# Patient Record
Sex: FEMALE | Race: WHITE
[De-identification: names, ages, dates, MRNs, and addresses within clinical notes are randomized per-mention and may not be internally consistent; named-entity substitution may affect disease eponyms.]

---

## 2018-04-11 ENCOUNTER — HOSPITAL ENCOUNTER (OUTPATIENT)
Dept: HOSPITAL 46 - DS | Age: 68
Discharge: HOME | End: 2018-04-11
Attending: OBSTETRICS & GYNECOLOGY
Payer: MEDICARE

## 2018-04-11 VITALS — BODY MASS INDEX: 29.02 KG/M2 | WEIGHT: 170 LBS | HEIGHT: 64 IN

## 2018-04-11 DIAGNOSIS — F17.200: ICD-10-CM

## 2018-04-11 DIAGNOSIS — Z79.899: ICD-10-CM

## 2018-04-11 DIAGNOSIS — F17.210: ICD-10-CM

## 2018-04-11 DIAGNOSIS — E66.9: ICD-10-CM

## 2018-04-11 DIAGNOSIS — N72: ICD-10-CM

## 2018-04-11 DIAGNOSIS — I10: ICD-10-CM

## 2018-04-11 DIAGNOSIS — Z98.890: ICD-10-CM

## 2018-04-11 DIAGNOSIS — Z79.82: ICD-10-CM

## 2018-04-11 DIAGNOSIS — N88.2: ICD-10-CM

## 2018-04-11 DIAGNOSIS — E78.00: ICD-10-CM

## 2018-04-11 DIAGNOSIS — N87.0: Primary | ICD-10-CM

## 2018-04-11 DIAGNOSIS — H40.9: ICD-10-CM

## 2018-04-11 DIAGNOSIS — D49.59: ICD-10-CM

## 2018-04-11 PROCEDURE — 0UT9FZZ RESECTION OF UTERUS, VIA NATURAL OR ARTIFICIAL OPENING WITH PERCUTANEOUS ENDOSCOPIC ASSISTANCE: ICD-10-PCS | Performed by: OBSTETRICS & GYNECOLOGY

## 2018-04-11 PROCEDURE — 0UT7FZZ RESECTION OF BILATERAL FALLOPIAN TUBES, VIA NATURAL OR ARTIFICIAL OPENING WITH PERCUTANEOUS ENDOSCOPIC ASSISTANCE: ICD-10-PCS | Performed by: OBSTETRICS & GYNECOLOGY

## 2018-04-11 NOTE — NUR
PT CONTINUES TO WRITHE IN PAIN. PT CONTINUES TO ASK FOR PAIN MEDICINE. CALL TO
[facility] TO ASSESS PATIENT'S BASELINE ANXIETY. CRNA REPORTS PATIENT SEEMED TO BE
ANXIOUS PREOP. CRNA DOES REPORT BRADYCARDIA UPON ARRIVAL TO OR AS WELL. WILL
CRACKERS GIVEN AND PT TOLERATES THOSE WELL. PRN GIVEN FOR PAIN.

## 2018-04-11 NOTE — NUR
PT UP TO BR W/RN ASSIST. PT VOIDS 25 ML BRIGHT YELLOW, URINE AND REPORTS "MY
STOMACH HURTS REALLY BAD". PT AMBULATES BACK TO BED AND POSITIONS HERSELF ON
HER RIGHT SIDE. CONTINUOUS PULSE OXIMETER CONNECTED AND OXYGEN SATURATION IS
98% ON RA. OXYGEN IS DC @ THIS TIME. URI HUGGER ON WARM. MORE ICED WATER
GIVEN. PT RESTS QUIETLY WHEN LEFT UNSTIMULATED.

## 2018-04-11 NOTE — NUR
10ML NS DEFLATED OUT OF BALLOON AND MCDANIEL CATHETER IS REMOVED. 200 ML BRIGHT
YELLOW URINE NOTED TO OVERNIGHT MCDANIEL CATHETER BAG. PATIENT ATTEMPTS TO GET
OUT OF BED PREMATURELY ON SEVERAL OCCASIONS. PATIENT FOLLOWS INSTRUCTIONS FROM
RN. PT AMBULATES TO THE BATHROOM QUICKLY WITH ASSISTANCE FROM RN. PT REQUESTS
TO BE LEFT ALONE IN THE BATHROOM AND IS INSTRUCTED ON HOW TO USE THE CALL
LIGHT. SHE VERBALIZES UNDERSTANDING.

## 2018-04-11 NOTE — NUR
PT UP TO BR W/RN STANDBY. PT AMBULATES WELL AND VOIDS 100 ML BRIGHT YELLOW,
URINE. PT REQ DC HOME. DAUGHTER PHONED AND WILL COME TO PROVIDE PT HER RIDE
HOME. PT DRESSING SELF.

## 2018-04-11 NOTE — NUR
PT WRITHING IN PAIN IN BED. RN @ BS CONTINUOUSLY D/T ALARM OF CONTINUOUS PULSE
OXIMETER NOT GETTING A GOOD READING. PT REPORTS "IT'S HOT IN HERE". RUI
HUGGER TURNED OFF. PRN GIVEN. PULSE IS 50 AND OXYGEN SATURATION IS 97% PT
APPEARS TO BE COMFORTABLE. SHE IS RESTING QUIETLY. RR EVEN AND UNLABORED.

## 2018-04-11 NOTE — NUR
PT DESATURATES TO 86% ON ROOM AIR WHILE SLEEPING. OXYGEN INCREASED TO 1L VIA
NC AND IMMEDIATELY INCREASES TO 91%

## 2018-04-11 NOTE — NUR
PT RESTING QUIETLY W/RR EVEN AND UNLABORED. PT WAKES EASILY TO RN VOICE AND
REPORTS FEELING "MUCH BETTER". PATIENT RATES PAIN "ZERO". OXYGEN SATURATION
98% ON 1L VIA NC. OXYGEN TURNED OFF @ THIS TIME. PT DENIES ADD'L NEEDS @ THIS
TIME.

## 2018-04-11 NOTE — NUR
04/11/18 1240 Kay Townsend 1231 PT ARRIVED IN PACU WITH ORAL AIRWAY IN PLACE. 1233 ORAL AIRWAY
REMOVED. PT SLEEPY WITH NO C/O'S. MCDANIEL CATHETER PRESENT WITH BRIGHT
YELLOW URINE.

## 2018-04-11 NOTE — NUR
PT WAS ALERT AND ORIENTED. SHE SEEMED PREPARED, WAITING FOR DR TO COME IN.
VERY PLEASANT, HAD NO QUESTIONS, PT DECLINED PRAYER AT THIS TIME. WILL FOLLOW
AS NEEDED

## 2018-04-11 NOTE — NUR
PT RETURNS TO DEPT QUITE SOMNOLENT. PT ABLE TO RATE PAIN; HOWEVER, WORDS ARE
SLURRED AT THIS TIME. WHEN STIMULATED, PT REPORTS "I NEED TO GET UP". PT IS
REASSURED W/RN VOICE. ICED WATER IS @ BS. BED RAILS ARE UP X 2. CALL LIGHT
W/IN REACH. ATTEMPT MADE TO LOCATE PATIENT'S FAMILY.

## 2018-04-11 NOTE — NUR
PHONE CALL TO DAUGHTER TO REPORT HER MOM IS BACK IN DAY SURGERY. DAUGHTER ASKS
IF MOTHER IS READY TO GO HOME. PLAN OF CARE RELAYED AND SHE VERBALIZES
UNDERSTANDING.

## 2018-04-11 NOTE — NUR
PT UNABLE TO VOID. SMALL DROPLETS OF BLOOD NOTED TO URINE HAT. FRANSISCO PAD AND
MESH PANTIES PLACED. PT MOANING AND REPEATING "MY STOMACH HURTS REALLY BAD".
PT AMBULATES BACK TO BED W/ASSIST. SCDS IN PLACE. RUI HUGGER ON WARM. PRN
PULLED FOR PAIN. WHEN UNSTIMULATED, PT FALLS QUICKLY TO SLEEP. WILL CONTINUE
TO MONITOR REGARDING PAIN MEDICINE. PT OXYGEN SATURATION REMAINS 98% ON 2L VIA
NC. OXYGEN REDUCED TO 1L VIA NC.

## 2018-04-13 NOTE — OR
[facility]
                                    [address] [place], [place]  [address]
_________________________________________________________________________________________
                                                                 Signed   
 
 
DATE OF OPERATION:
2018
 
SURGEON:
Leona Ledezma MD
 
FIRST ASSISTANT:
Jovi Fraser MD.
 
PREOPERATIVE DIAGNOSIS:
Recurrent cervical dysplasia, cervical stenosis.
 
POSTOPERATIVE DIAGNOSIS:
Recurrent cervical dysplasia, cervical stenosis.
 
PROCEDURES:
Laparoscopically assisted total vaginal hysterectomy, bilateral salpingectomy, and
cystoscopy. 
 
ANESTHESIA:
General ET.
 
ESTIMATED BLOOD LOSS:
100 mL.
 
DRAINS:
Tran catheter.
 
INDICATIONS AND FINDINGS:
The patient is a 67-year-old female,  2, para 2, who has a long history of
cervical dysplasia and has undergone 2 prior LEEPs as well as a cone biopsy.  Her last
Pap smear was again abnormal with a low-grade lesion and high-risk HPV.  Unfortunately
because of her prior procedure, she has almost no visible cervix and also has severe
cervical stenosis.  Options were discussed and she wished to proceed with hysterectomy.
At the time of surgery, her cervix was very tiny, visible within the vagina.  The os was
very stenotic.  The uterus was otherwise small and the ovaries were normal.  The tubes
had evidence of prior tubal ligation.  Perforation of the cervix did occur during
dilation and placement of the VCare. 
 
DESCRIPTION OF PROCEDURE:
The patient was prepped and draped in the dorsal lithotomy position.  A weighted
speculum was placed and the anterior lip of the cervix was visualized with quite a bit
 
    Electronically Signed By: LEONA LEDEZMA MD  18 0941
_________________________________________________________________________________________
PATIENT NAME:     MICHAEL ARMAS                           
MEDICAL RECORD #: [id number]            OPERATIVE REPORT              
          ACCT #: [id number]  
DATE OF BIRTH:   [birth year]            REPORT #: [id number]      
PHYSICIAN:        LEONA LEDEZMA MD            
PCP:              NO PRIMARY CARE PHYSICIAN     
REPORT IS CONFIDENTIAL AND NOT TO BE RELEASED WITHOUT AUTHORIZATION
 
 
                                  [facility]
                                    [address], [place]  [address]
_________________________________________________________________________________________
                                                                 Signed   
 
 
of difficulty and grasped with a single-tooth tenaculum.  The os was pinpoint.  The os
was then dilated, but it was immediately apparent that perforation had occurred.  The
VCare was then placed and the balloon inflated and the tenaculum and speculum were
removed and the cup was fitted over the cervix and the locking cap was fitted into
place.  Attention was then directed above after changing gloves.  The infraumbilical
area was then injected with 0.5% Marcaine plain.  An incision was made with a knife and
then each layer was serially elevated and incised until the fascia was opened and
identified.  There was some bleeding on the muscle, which was controlled by cautery.
The peritoneum was opened bluntly.  The Torrey cannula was then placed and the balloon
inflated within the abdomen.  The Torrey was also tied into place.  The laparoscope was
placed and this confirmed proper positioning of the instruments.  The abdomen was
inflated with CO2 gas.  The lateral ports were
placed lateral and slightly inferior to the umbilical incisions.  These areas were
transilluminated, injected with the Marcaine.  Incisions made with a knife and the
trocars placed under direct vision.  The left lower quadrant site was a 5 mm port with a
balloon inflated within the abdomen.  The right side was the Veress needle placement
followed by the expanding port.  Inspection of the pelvis revealed that the
V-Care balloon was
in the lower aspect of the right broad ligament.  There was a small hematoma present.
The decision was made to try to replace the balloon into the proper location.  Attention
was directed down below while Dr. Fraser watched from above and the VCare was removed.
 An attempt was made to place it into the uterus, but it would continue to go into the
right broad ligament.  Decision was made to just leave all of the instruments out of the
vagina other than a sponge stick and convert the case to a laparoscopically assisted
hysterectomy.  Following with this, gloves were changed again and the LigaSure [place]
device was used to remove the patient's left tube and then serially coagulate and divide
the utero-ovarian ligament and the round ligament and to take down the peritoneum
anteriorly, allowing for a partial bladder flap.  The peritoneum was taken down
posteriorly as well.  Attention was then directed to the patient's right side and the
same procedure was done with removal of the tube and then serial cautery and incision of
the utero-ovarian pedicle in the round ligament.  Again, the anterior leaf of the
peritoneum was taken down as was the posterior leaf.  The uterine vessels were
skeletonized and coagulated multiple times and then divided.  This was also done on the
patient's left side.  At that point, it was felt that the safest option would be to go
down below, as the end of the cervix could not really be identified from above.
Following this, the gas was allowed to escape from the abdomen and the abdomen was
covered with sterile drapes and attention was directed down below again.  The sponge
stick was removed and a weighted speculum was placed and the anterior lip of the cervix
was visualized with difficulty and again grasped with a single-tooth tenaculum.  The
midportion of the cervix was also grasped with a single-tooth tenaculum.  The posterior
cul-de-sac was opened sharply with the Abraham scissors.  The Mobile-Neck speculum was then
placed into the posterior cul-de-sac.  The uterosacral ligaments were grasped on each
 
    Electronically Signed By: LEONA LEDEZMA MD  18 0941
_________________________________________________________________________________________
PATIENT NAME:     MICHAEL ARMAS                           
MEDICAL RECORD #: [id number]            OPERATIVE REPORT              
          ACCT #: [id number]  
DATE OF BIRTH:   [birth year]            REPORT #: [id number]      
PHYSICIAN:        LEONA LEDEZMA MD            
PCP:              NO PRIMARY CARE PHYSICIAN     
REPORT IS CONFIDENTIAL AND NOT TO BE RELEASED WITHOUT AUTHORIZATION
 
 
                                  [facility]
                                    [address], [place]  [address]
_________________________________________________________________________________________
                                                                 Signed   
 
 
side and divided with the Abraham scissors and suture ligated with 0 Vicryl.  The vaginal
mucosa was then circumscribed with the knife and sharp dissection was used to take the
vaginal mucosa off the cervix and the anterior cul-de-sac entered at that time.  The
clamp was then used to take down the cardinal ligament areas on each side with each
pedicle divided with the Abraham scissors and suture ligated with 0 Vicryl.  At this point,
the specimen was excised.  Bleeding points in the lateral cuff areas were controlled
with figure-of-8 sutures of 0 Vicryl.  This was done bilaterally.  The cuff otherwise
appeared to be hemostatic.  Because of the difficulty with the case and the poor
anatomy, the decision was made to proceed with cystoscopy at this point.  The Tran
catheter was removed and the cystoscope was placed.  The patient had received IV
fluorescein.  The bladder was completely evaluated.  There was no evidence of any
injury.  Clear nonfluorescein urine was seen to freely egress from both of the ureteral
orifices.  The cystoscopy was completed at that point, and the cystoscope removed and
the bladder drained.  The Tran catheter was replaced.  A glove filled with a wet lap
tape was then placed in the vagina to allow the pneumoperitoneum above to re-create.
Gloves were changed again and attention was directed to the abdomen.  The abdomen was
copiously irrigated and inspected.  There was some bleeding from just above the
patient's left angle.  This was eventually controlled using the spatula tip cautery, but
the LigaSure device was also used.  An Endoloop of 0 PDS was also placed around this
pedicle.  At that point, good hemostasis was noted.  The pressure was turned down and it
still appeared hemostatic.  Following this, the Endo Stitch was used to close the
vaginal cuff.  This was taken from the patient's right uterosacral ligament
incorporating the peritoneum and the
vaginal mucosa both posteriorly and anteriorly.  
This was run from the right to left uterosacral ligament and then back to the center.
Following this, the pelvis was again irrigated and inspected and found to be hemostatic.
 Because of the prior difficulty, however, Evicel was used on the vaginal cuff to aid in
hemostasis.  The instruments were removed from the abdomen after allowing as much CO2 as
possible to escape.  The fascial incision of the umbilicus was closed with a running
suture of 0 Vicryl.  The skin incisions were closed with subcuticular sutures of 3-0
Vicryl Rapide.  The vaginal pack was removed at the end of the surgery.  The patient
tolerated the procedure well.  All sponge and needle counts were correct. 
 
 
 
            ________________________________________
            Leona Ledezma MD 
 
 
PJW/MODL
Job #:  [id number]
DD:  2018 12:41:48
 
    Electronically Signed By: LEONA LEDEZMA MD  18 0941
_________________________________________________________________________________________
PATIENT NAME:     CHANDA                           
MEDICAL RECORD #: [id number]            OPERATIVE REPORT              
          ACCT #: [id number]  
DATE OF BIRTH:   [birth year]            REPORT #: [id number]      
PHYSICIAN:        LEONA LEDEZMA MD            
PCP:              NO PRIMARY CARE PHYSICIAN     
REPORT IS CONFIDENTIAL AND NOT TO BE RELEASED WITHOUT AUTHORIZATION
 
 
                                  [address]  [address]
_________________________________________________________________________________________
                                                                 Signed   
 
 
DT:  2018 19:54:46
 
cc:            Dr. Vivek Fraser MD
 
 
Copies:  JOVI FRASER MD
~
 
 
 
 
 
 
 
 
 
 
 
 
 
 
 
 
 
 
 
 
 
 
 
 
 
 
 
 
 
 
 
 
 
 
    Electronically Signed By: LEONA LEDEZMA MD  18 0941
_________________________________________________________________________________________
PATIENT NAME:     MICHAEL ARMAS                           
MEDICAL RECORD #: [id number]            OPERATIVE REPORT              
          ACCT #: [id number]  
DATE OF BIRTH:   [birth year]            REPORT #: [id number]      
PHYSICIAN:        LEONA LEDEZMA MD            
PCP:              NO PRIMARY CARE PHYSICIAN     
REPORT IS CONFIDENTIAL AND NOT TO BE RELEASED WITHOUT AUTHORIZATION